# Patient Record
Sex: FEMALE | Race: WHITE | NOT HISPANIC OR LATINO | URBAN - METROPOLITAN AREA
[De-identification: names, ages, dates, MRNs, and addresses within clinical notes are randomized per-mention and may not be internally consistent; named-entity substitution may affect disease eponyms.]

---
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
## 2018-04-10 ENCOUNTER — OFFICE VISIT - HEALTHEAST (OUTPATIENT)
Dept: FAMILY MEDICINE | Facility: CLINIC | Age: 18
End: 2018-04-10

## 2018-04-10 ENCOUNTER — COMMUNICATION - HEALTHEAST (OUTPATIENT)
Dept: TELEHEALTH | Facility: CLINIC | Age: 18
End: 2018-04-10

## 2018-04-10 DIAGNOSIS — F90.9 ATTENTION DEFICIT HYPERACTIVITY DISORDER (ADHD): ICD-10-CM

## 2018-04-10 DIAGNOSIS — L98.9 SKIN LESION OF LEFT ARM: ICD-10-CM

## 2019-09-16 ENCOUNTER — OFFICE VISIT - HEALTHEAST (OUTPATIENT)
Dept: FAMILY MEDICINE | Facility: CLINIC | Age: 19
End: 2019-09-16

## 2019-09-16 DIAGNOSIS — T14.8XXA BRUISING: ICD-10-CM

## 2019-09-16 LAB
APTT PPP: 39 SECONDS (ref 24–37)
ERYTHROCYTE [DISTWIDTH] IN BLOOD BY AUTOMATED COUNT: 11.3 % (ref 11–14.5)
HCT VFR BLD AUTO: 45.9 % (ref 35–47)
HGB BLD-MCNC: 15.7 G/DL (ref 12–16)
INR PPP: 0.95 (ref 0.9–1.1)
MCH RBC QN AUTO: 31.5 PG (ref 27–34)
MCHC RBC AUTO-ENTMCNC: 34.3 G/DL (ref 32–36)
MCV RBC AUTO: 92 FL (ref 80–100)
PLATELET # BLD AUTO: 414 THOU/UL (ref 140–440)
PMV BLD AUTO: 6.9 FL (ref 7–10)
RBC # BLD AUTO: 4.99 MILL/UL (ref 3.8–5.4)
WBC: 9.4 THOU/UL (ref 4–11)

## 2019-09-19 ENCOUNTER — OFFICE VISIT - HEALTHEAST (OUTPATIENT)
Dept: FAMILY MEDICINE | Facility: CLINIC | Age: 19
End: 2019-09-19

## 2019-09-19 DIAGNOSIS — N94.10 DYSPAREUNIA IN FEMALE: ICD-10-CM

## 2019-09-19 DIAGNOSIS — T14.8XXA BRUISE: ICD-10-CM

## 2019-09-19 DIAGNOSIS — Z97.5 IUD CONTRACEPTION: ICD-10-CM

## 2019-09-19 LAB
ALBUMIN SERPL-MCNC: 4.5 G/DL (ref 3.5–5)
ALP SERPL-CCNC: 76 U/L (ref 45–120)
ALT SERPL W P-5'-P-CCNC: <9 U/L (ref 0–45)
ANION GAP SERPL CALCULATED.3IONS-SCNC: 10 MMOL/L (ref 5–18)
AST SERPL W P-5'-P-CCNC: 14 U/L (ref 0–40)
BASOPHILS # BLD AUTO: 0.1 THOU/UL (ref 0–0.2)
BASOPHILS NFR BLD AUTO: 1 % (ref 0–2)
BILIRUB SERPL-MCNC: 0.4 MG/DL (ref 0–1)
BUN SERPL-MCNC: 9 MG/DL (ref 8–22)
CALCIUM SERPL-MCNC: 10.1 MG/DL (ref 8.5–10.5)
CHLORIDE BLD-SCNC: 105 MMOL/L (ref 98–107)
CO2 SERPL-SCNC: 26 MMOL/L (ref 22–31)
CREAT SERPL-MCNC: 0.77 MG/DL (ref 0.6–1.1)
EOSINOPHIL # BLD AUTO: 0.6 THOU/UL (ref 0–0.4)
EOSINOPHIL NFR BLD AUTO: 5 % (ref 0–6)
ERYTHROCYTE [DISTWIDTH] IN BLOOD BY AUTOMATED COUNT: 11.4 % (ref 11–14.5)
GFR SERPL CREATININE-BSD FRML MDRD: >60 ML/MIN/1.73M2
GLUCOSE BLD-MCNC: 81 MG/DL (ref 70–125)
HCT VFR BLD AUTO: 42.2 % (ref 35–47)
HGB BLD-MCNC: 14.3 G/DL (ref 12–16)
LYMPHOCYTES # BLD AUTO: 3.3 THOU/UL (ref 0.8–4.4)
LYMPHOCYTES NFR BLD AUTO: 28 % (ref 20–40)
MCH RBC QN AUTO: 30.7 PG (ref 27–34)
MCHC RBC AUTO-ENTMCNC: 33.8 G/DL (ref 32–36)
MCV RBC AUTO: 91 FL (ref 80–100)
MONOCYTES # BLD AUTO: 0.8 THOU/UL (ref 0–0.9)
MONOCYTES NFR BLD AUTO: 7 % (ref 2–10)
NEUTROPHILS # BLD AUTO: 7.1 THOU/UL (ref 2–7.7)
NEUTROPHILS NFR BLD AUTO: 60 % (ref 50–70)
PLATELET # BLD AUTO: 433 THOU/UL (ref 140–440)
PMV BLD AUTO: 7.2 FL (ref 7–10)
POTASSIUM BLD-SCNC: 4.3 MMOL/L (ref 3.5–5)
PROT SERPL-MCNC: 7.2 G/DL (ref 6–8)
RBC # BLD AUTO: 4.64 MILL/UL (ref 3.8–5.4)
SODIUM SERPL-SCNC: 141 MMOL/L (ref 136–145)
TSH SERPL DL<=0.005 MIU/L-ACNC: 1.5 UIU/ML (ref 0.3–5)
WBC: 11.9 THOU/UL (ref 4–11)

## 2019-09-19 ASSESSMENT — MIFFLIN-ST. JEOR: SCORE: 1494.57

## 2019-10-02 ENCOUNTER — COMMUNICATION - HEALTHEAST (OUTPATIENT)
Dept: ADMINISTRATIVE | Facility: HOSPITAL | Age: 19
End: 2019-10-02

## 2019-10-02 ENCOUNTER — COMMUNICATION - HEALTHEAST (OUTPATIENT)
Dept: ONCOLOGY | Facility: CLINIC | Age: 19
End: 2019-10-02

## 2019-10-22 ENCOUNTER — OFFICE VISIT - HEALTHEAST (OUTPATIENT)
Dept: ONCOLOGY | Facility: CLINIC | Age: 19
End: 2019-10-22

## 2019-10-22 DIAGNOSIS — T14.8XXA BRUISE: ICD-10-CM

## 2019-10-22 LAB
APTT PPP: 42 SECONDS (ref 24–37)
BASOPHILS # BLD AUTO: 0.1 THOU/UL (ref 0–0.2)
BASOPHILS NFR BLD AUTO: 1 % (ref 0–2)
CLOSURE TME COLL+EPINEP BLD: 127 SEC (ref 1–180)
EOSINOPHIL # BLD AUTO: 0.3 THOU/UL (ref 0–0.4)
EOSINOPHIL NFR BLD AUTO: 4 % (ref 0–6)
ERYTHROCYTE [DISTWIDTH] IN BLOOD BY AUTOMATED COUNT: 12.3 % (ref 11–14.5)
FIBRINOGEN PPP-MCNC: 312 MG/DL (ref 170–410)
HCT VFR BLD AUTO: 40.1 % (ref 35–47)
HGB BLD-MCNC: 13.8 G/DL (ref 12–16)
INR PPP: 1 (ref 0.9–1.1)
LYMPHOCYTES # BLD AUTO: 2.2 THOU/UL (ref 0.8–4.4)
LYMPHOCYTES NFR BLD AUTO: 25 % (ref 20–40)
MCH RBC QN AUTO: 30.4 PG (ref 27–34)
MCHC RBC AUTO-ENTMCNC: 34.4 G/DL (ref 32–36)
MCV RBC AUTO: 88 FL (ref 80–100)
MONOCYTES # BLD AUTO: 0.7 THOU/UL (ref 0–0.9)
MONOCYTES NFR BLD AUTO: 8 % (ref 2–10)
NEUTROPHILS # BLD AUTO: 5.4 THOU/UL (ref 2–7.7)
NEUTROPHILS NFR BLD AUTO: 62 % (ref 50–70)
PATH REPORT.MICROSCOPIC SPEC OTHER STN: ABNORMAL
PLATELET # BLD AUTO: 397 THOU/UL (ref 140–440)
PMV BLD AUTO: 8.8 FL (ref 8.5–12.5)
RBC # BLD AUTO: 4.54 MILL/UL (ref 3.8–5.4)
WBC: 8.8 THOU/UL (ref 4–11)

## 2019-10-22 ASSESSMENT — MIFFLIN-ST. JEOR: SCORE: 1478.01

## 2019-10-23 ENCOUNTER — COMMUNICATION - HEALTHEAST (OUTPATIENT)
Dept: ADMINISTRATIVE | Facility: HOSPITAL | Age: 19
End: 2019-10-23

## 2019-10-24 LAB
LAB AP CHARGES (HE HISTORICAL CONVERSION): NORMAL
PATH REPORT.COMMENTS IMP SPEC: NORMAL
PATH REPORT.COMMENTS IMP SPEC: NORMAL
PATH REPORT.FINAL DX SPEC: NORMAL
PATH REPORT.MICROSCOPIC SPEC OTHER STN: NORMAL
PATH REPORT.RELEVANT HX SPEC: NORMAL

## 2019-10-25 LAB
FACT VIII ACT/NOR PPP: 70 % (ref 55–200)
VWF AG ACT/NOR PPP IA: 65 % (ref 50–200)
VWF:AC ACT/NOR PPP IA: 55 % (ref 50–180)

## 2019-10-30 LAB — VWF:RCO ACT/NOR PPP PL AGG: 62 %

## 2019-10-31 LAB — VON WILLEBRAND EVAL PPP-IMP: NORMAL

## 2019-11-06 ENCOUNTER — OFFICE VISIT - HEALTHEAST (OUTPATIENT)
Dept: ONCOLOGY | Facility: CLINIC | Age: 19
End: 2019-11-06

## 2019-11-06 DIAGNOSIS — R23.3 EASY BRUISABILITY: ICD-10-CM

## 2021-05-30 VITALS — BODY MASS INDEX: 28.42 KG/M2 | WEIGHT: 163 LBS

## 2021-05-31 VITALS — BODY MASS INDEX: 26.38 KG/M2 | WEIGHT: 151.31 LBS

## 2021-06-01 VITALS — WEIGHT: 145.31 LBS | BODY MASS INDEX: 25.34 KG/M2

## 2021-06-01 NOTE — PATIENT INSTRUCTIONS - HE
Lab work that is back so far is normal.  We will call you with remaining results.  Follow up with Dr. Flores for further workup.

## 2021-06-01 NOTE — TELEPHONE ENCOUNTER
Call placed to Dinora to attempt to schedule a consult with Hematology, referral received from Dr. Sandra Case.   Diagnosis for referral to hematology: Excessive bruising.   Left message with my contact information on it and reason for call. Asked that Dinora return my call so I may assist in scheduling an appointment with hematology.  Will attempt to call again to set up apt.     In Basket message received for Hematology Consult, referring physician is Dr.Thao Case. Call placed to Dinora to set up appointment. Same scheduled for Tuesday, October 22, 2019 at 2:00pm with . With a nurse apt at  1:45pm. E mail sent to [email or web address] with informational letter, MD card, [facility] intake form, medication and allergy list, and appointment letter. Work up for Excessive Bruising has been done at .   Medical records will collect any outside records.      I explained that Dinora would becoming to a cancer center and that the doctors are both cancer and blood doctors. Explained the appointment process of arriving early and bringing Health History and medication allergy list along to appointment.     Given [facility] to call if Dinora has further questions or concerns. [phone]

## 2021-06-01 NOTE — PROGRESS NOTES
ASSESSMENT/PLAN    Bruise  Traumatic and spontaneous  Normal lab results  No known etiology  Will refer to hematology for 2nd opinion  -     HM1(CBC and Differential)  -     Comprehensive Metabolic Panel  -     Thyroid Stimulating Hormone (TSH)  -     HM1 (CBC with Diff)    Dyspareunia in female  With associated heavy bleeding  Patient expressed interest in removal; she's come back for removal  May want to consider pelvic ultrasound   Denied STD concerns    IUD contraception  Patient expressed interest in removal; she's come back for removal      Orders Placed This Encounter   Procedures     Comprehensive Metabolic Panel     Thyroid Stimulating Hormone (TSH)     HM1 (CBC with Diff)           CHIEF COMPLAINT:  Chief Complaint   Patient presents with     bruising     bruise very easily     discuss removing IUD     bleeds after having intercourse     bad menstrual cramps     letter for stand up desk     for work       HISTORY OF PRESENT ILLNESS:  Dinora is a 19 y.o. female presenting to the clinic today for bruising and IUD removal. She is no longer taking adderall.     Bruising: She has always bruised easily but now they are taking longer to heal. Most of the bruises are spontaneous. One of them, she bumped her bed lightly on Saturday. She has bruises that are as old as 2 months. She went to a walk-in clinic on Monday because her mom wanted her to get checked out. She is not on any medication for depression or anxiety. She takes ibuprofen and aleve when she is on her period but she has not taken it within the last 2 weeks. She was brushing her teeth and nicked her gums which caused her mouth to fill with blood. She still has heavy periods even with the Mirena, which was placed in 2017. She changes her tampon every 2-3 hours. She denies any blood in the stools or urine. She has never had a nose bleed.     IUD Removal: She would like to discuss removing her IUD. She will be sitting and feel a sharp pain in her uterus.  "She also gets pain whenever she has sex with her boyfriend. She will then get a full cycle afterward. She would probably go back on the hormone pill if the IUD was removed.     Standing Desk Note: At work, she has a sitting desk. All of the new offices have a standing desk. The company's home office also got all new standing desks. They are unwilling to re-do everyone else's desk. She will occasionally get back pain with her periods. She knows standing desks are better for preventing back problems as well. She inquires about getting a note from work to get a standing desk.     REVIEW OF SYSTEMS:   Constitutional: Negative.   HENT: Negative.   Eyes: Negative.   Respiratory: Negative.   Cardiovascular: Negative.   Gastrointestinal: Negative.   Endocrine: Negative.   Genitourinary: Negative.   Musculoskeletal: Negative.   Skin: Negative.   Allergic/Immunologic: Negative.   Neurological: Negative.   Hematological: Negative.   Psychiatric/Behavioral: Negative.   All other systems are negative.    PFSH:  Dad  when she was 10 years old. He had a clotting disorder but they are unsure of what he had.     TOBACCO USE:  Social History     Tobacco Use   Smoking Status Never Smoker   Smokeless Tobacco Never Used       VITALS:  Vitals:    19 1534   BP: 112/84   Patient Site: Left Arm   Patient Position: Sitting   Cuff Size: Adult Regular   Pulse: 92   Weight: 167 lb (75.8 kg)   Height: 5' 3.5\" (1.613 m)     Wt Readings from Last 3 Encounters:   19 167 lb (75.8 kg)   19 167 lb (75.8 kg)   04/10/18 145 lb 5 oz (65.9 kg) (75 %, Z= 0.69)*     * Growth percentiles are based on CDC (Girls, 2-20 Years) data.       PHYSICAL EXAM:  Constitutional: Patient is oriented to person, place, and time. Patient appears well-developed and well-nourished. No distress.   Head: Normocephalic and atraumatic.   Right Ear: External ear normal.   Left Ear: External ear normal.   Neurological: Patient is alert and oriented to person, " place, and time. Patient has normal reflexes. No cranial nerve deficit. Coordination normal.   Skin: Skin is warm and dry. No rash noted. Patient is not diaphoretic. No erythema. No pallor. Multiple bruises at different stages on arms and legs bilaterally    ADDITIONAL HISTORY SUMMARIZED (2): Reviewed walk-in clinic note from 9/16/19 for bruising.  DECISION TO OBTAIN EXTRA INFORMATION (1): None.   RADIOLOGY TESTS (1): None.  LABS (1): Reviewed labs from 9/16/19 and ordered labs today.   MEDICINE TESTS (1): None.  INDEPENDENT REVIEW (2 each): None.     The visit lasted a total of 10 minutes face to face with the patient. Over 50% of the time was spent counseling and educating the patient about bruising and IUD removal.    ICatia,  am scribing for and in the presence of, Dr. Flores.    I, Dr. Flores, personally performed the services described in this documentation, as scribed by Catia Romero in my presence, and it is both accurate and complete.    MEDICATIONS:  Current Outpatient Medications   Medication Sig Dispense Refill     levonorgestrel (MIRENA) 20 mcg/24 hr (5 years) IUD 1 each by Intrauterine route once.       triamcinolone (KENALOG) 0.1 % ointment Apply topically 2 (two) times a day. 30 g 0     No current facility-administered medications for this visit.        Total data points: 3

## 2021-06-01 NOTE — PROGRESS NOTES
"ASSESSMENT:   1. Bruising  HM2(CBC w/o Differential)    APTT(PTT)    INR    [facility] RED       PLAN:  Labs were obtained at patient request, CBC is without abnormality and normal platelet count.  INR, PTT pending.  Discussed with patient that further work-up would have to be pursued after discussion with her primary care provider as I am unable to obtain further testing from a walk-in care.  She is reassured at this point, and intends to follow-up with primary care for further discussion.    I discussed red flag symptoms, return precautions to clinic/ER and follow up care with patient/parent.  Expected clinical course, symptomatic cares advised. Questions answered. Patient/parent amenable with plan.    Patient Instructions:  Patient Instructions   Lab work that is back so far is normal.  We will call you with remaining results.  Follow up with Dr. Flores for further workup.      SUBJECTIVE:   Dinora Colunga is a 19 y.o. female who presents today with concerns regarding a \"clotting disorder\".  Patient notes that she bruises very easily, typically to her legs, but occasionally to her arms.  She notes that there is always a cause of the bruise such as bumping into something or injury, but feels that her bruises have worsened over the past several months.  She also notes that this morning she was brushing her teeth and had bleeding from her gums.  She has not seen a dentist in quite some time, cannot remember when she saw one last.  She notes that her dad had a \"blood clotting problem\", but is unsure of what this was and he passed away when she was 10.  States that her mother insisted she be seen today to be worked up for a clotting disorder.  Notes that she has also had very heavy periods since having an IUD put in in 2017, however the IUD was put in due to heavy periods and cramping.  Denies dizziness, shortness of breath, chest pain, extremity pain, abdominal pain, any other concerns.  Notes that she discontinued taking " "ibuprofen because her mother told her that it was bad for her \"blood problems\", asked her to begin taking naproxen instead which she takes 1-2 times a month for menstrual cramps.      ROS:  Comprehensive 12 pt ROS completed, positives noted in HPI, otherwise negative.      Past Medical History:  Patient Active Problem List   Diagnosis     Attention deficit hyperactivity disorder (ADHD)     Controlled substance agreement signed, 6/29/16, adderall 20mg BID     IUD insertion 1/3/17       Surgical History:  Past Surgical History:   Procedure Laterality Date     TONSILLECTOMY             Family History:  Family History   Problem Relation Age of Onset     Arthritis Mother      Diabetes Father      Hypertension Father      Heart disease Father      ADD / ADHD Brother      ADD / ADHD Brother      ADD / ADHD Brother        Reviewed; Non-contributory    Social History     Tobacco Use   Smoking Status Never Smoker   Smokeless Tobacco Never Used       Current Medications:  Current Outpatient Medications on File Prior to Visit   Medication Sig Dispense Refill     levonorgestrel (MIRENA) 20 mcg/24 hr (5 years) IUD 1 each by Intrauterine route once.       triamcinolone (KENALOG) 0.1 % ointment Apply topically 2 (two) times a day. 30 g 0     [DISCONTINUED] dextroamphetamine-amphetamine (ADDERALL) 20 mg Tab Take 20 mg by mouth 2 (two) times a day. One of three 60 tablet 0     [DISCONTINUED] dextroamphetamine-amphetamine (ADDERALL) 20 mg Tab Take 20 mg by mouth 2 (two) times a day. Two of three 60 tablet 0     [DISCONTINUED] dextroamphetamine-amphetamine (ADDERALL) 20 mg Tab Take 20 mg by mouth 2 (two) times a day. Three of three 60 tablet 0     No current facility-administered medications on file prior to visit.        Allergies:   No Known Allergies    OBJECTIVE:   Vitals:    09/16/19 1555 09/16/19 1558   BP: 140/88 140/82   Patient Site: Right Arm Right Arm   Patient Position: Sitting Sitting   Cuff Size: Adult Large Adult Large "   Pulse: (!) 103    Resp: 16    Temp: 98.7  F (37.1  C)    TempSrc: Oral    SpO2: 98%    Weight: 167 lb (75.8 kg)      Physical exam reveals a pleasant 19 y.o. female.   General: Appears healthy, alert and cooperative. Non-toxic appearance.  Eyes: Nonicteric  Mouth:  Mucosa pink and moist.  Normal dentition, no bleeding, no lacerations or wounds  Neck: supple  Pulmonary/Chest: Chest is clear, no wheezing, rhonchi or rales. Symmetric air entry throughout both lung fields. Symmetrical chest wall movement.  Heart: regular rate and rhythm, no murmur, rub or gallop  Abdomen: soft, nontender. No masses or organomegaly  Neuro: Alert, oriented. Non-focal.  Skin: pink, warm, dry.  Multiple bruises to the lower extremities in various stages of healing.  Psychiatric: Normal mood and affect.  Normal judgement and thought content. Normal behavior.       RADIOLOGY    none  LABORATORY STUDIES    Recent Results (from the past 24 hour(s))   HM2(CBC w/o Differential)   Result Value Ref Range    WBC 9.4 4.0 - 11.0 thou/uL    RBC 4.99 3.80 - 5.40 mill/uL    Hemoglobin 15.7 12.0 - 16.0 g/dL    Hematocrit 45.9 35.0 - 47.0 %    MCV 92 80 - 100 fL    MCH 31.5 27.0 - 34.0 pg    MCHC 34.3 32.0 - 36.0 g/dL    RDW 11.3 11.0 - 14.5 %    Platelets 414 140 - 440 thou/uL    MPV 6.9 (L) 7.0 - 10.0 fL           Jessica Vernon, PRATIMA

## 2021-06-02 NOTE — PROGRESS NOTES
[facility] Hematology and Oncology Consult Note    Patient: Dinora Colunga  MRN: [id number]  Date of Service: 10/22/2019      Reason for Visit    Chief Complaint   Patient presents with     Benign Hematology     referred by Dr. Flores regarding easy bruising    Assessment/Plan    ECOG Performance   ECOG Performance Status: 0  Distress Assessment  Distress Assessment Score: 3(visit today)    #.  Easy bruising  #.  Minor mucosal bleeding.  Has not required any intervention.  #.  Heavy menstrual bleeding     She is history of mucosa bleeding and bruising but no purpura.  No history to suggest malnutrition and, vitamin deficiency, autoimmune disease, liver disease or myeloproliferative diseases.  Never received pharmacological intervention for bleeding or required blood transfusion.  No real family history of bleeding disorder.  Her platelet count was normal.  I discussed about possible etiologies which is numerous.  I discussed about initial screening tests as below.  I also explained to her that sometimes it could be idiopathic that we may not identify the etiology which is fairly common in a woman with bruising on the extremities.   Follow-up with me in about 2 weeks to review the results.     I discussed about potential intervention with fibrinolytic agent for heavy menstrual bleeding if needed.  Clinically though, she does not have anemia and it is encouraging.      Problem List    1. Bruise  APTT(PTT)    INR    Fibrinogen    Platelet Function Test    Von Willebrand Factor Activity (VWF:Act)    Von Willebrand Antigen    Factor 8 Assay    Morphology,Smear Review (MORP)    Peripheral Blood Smear, Path Review     ______________________________________________________________________________    History    Ms. Dinora Colunga is a very pleasant 19-year-old female who is here for evaluation of frequent/severe bruising.  She remembered her whole life that she is very easy to bruise.  Her skin bruising is only in arm or  lower extremities.  She does not recall any spontaneous bruising in her body.  No history of frequent epistaxis.  Intermittent gum bleeding with brushing.  Denies blood in urine or bowel.  She noted vaginal bleeding after sexual intercourse very easy.  She has heavy menstrual cycle with approximately 5-7 days in each cycle.  She has history of anemia but never been on iron supplements or  never received blood transfusion.  She was on birth control pil, but she is currently have Mirena since 2017 because she forgot to take birth control pill regularly.  Her current menstrual cycle usually lasted about 2 weeks out of every 28-30-day cycle.  She never needs to take any medication to control her menstrual bleeding.    History of left breast cyst removal and tonsillectomy.  No recall of excessive bleeding postoperatively.    She does not know her father's side of the family.  Her mother does not have bleeding disorder or easy bruising.  She has 1 brother and 1 brother.  They do not have any bleeding tendency.    She does not take any medication on regular basis.  She denies use of over-the-counter supplements.  Never smoker.  She drinks alcohol very occasionally.    No history of connective tissue disease, chronic inflammatory disease, or chronic liver disease or kidney disease.    Past History    Past Medical History:   Diagnosis Date     ADHD (attention deficit hyperactivity disorder)     Family History   Problem Relation Age of Onset     Arthritis Mother      Diabetes Father      Hypertension Father      Heart disease Father      ADD / ADHD Brother      ADD / ADHD Brother      ADD / ADHD Brother       Past Surgical History:   Procedure Laterality Date     BREAST LUMPECTOMY       TONSILLECTOMY      Social History     Socioeconomic History     Marital status: Single     Spouse name: Not on file     Number of children: Not on file     Years of education: Not on file     Highest education level: Not on file   Occupational  History     Not on file   Social Needs     Financial resource strain: Not on file     Food insecurity:     Worry: Not on file     Inability: Not on file     Transportation needs:     Medical: Not on file     Non-medical: Not on file   Tobacco Use     Smoking status: Never Smoker     Smokeless tobacco: Never Used   Substance and Sexual Activity     Alcohol use: No     Comment: 1, if any     Drug use: No     Sexual activity: Not Currently   Lifestyle     Physical activity:     Days per week: Not on file     Minutes per session: Not on file     Stress: Not on file   Relationships     Social connections:     Talks on phone: Not on file     Gets together: Not on file     Attends [employer] service: Not on file     Active member of club or organization: Not on file     Attends meetings of clubs or organizations: Not on file     Relationship status: Not on file     Intimate partner violence:     Fear of current or ex partner: Not on file     Emotionally abused: Not on file     Physically abused: Not on file     Forced sexual activity: Not on file   Other Topics Concern     Not on file   Social History Narrative     Not on file        Allergies    No Known Allergies    Review of Systems    General  General (WDL): Exceptions to WDL  Fatigue: None  Fever: None  Generalized Muscle Weakness: None  Weight Loss: Yes - Recent (Greater than 3 months)(6# 9/19/1940-[id number]-[id number] up early to exercise)  ENT  ENT (WDL): Exceptions to WDL  Changes in vision: None  Glasses or Contacts: Yes - Chronic (Greater than 3 months)  Hearing loss: None  Hearing Aids: None  Tinnitus: None  Pain/Pressure in ears: None  Sinus Congestion/Drainage: None  Hoarseness: None  Sore Throat: None  Dental Problems: None  Dentures: None  Respiratory  Respiratory (WDL): All respiratory elements are within defined limits  Cardiovascular  Cardiovascular (WDL): Exceptions to WDL  Palpitations: None  Edema Limbs: None  Irregular Heart Beat: None  Chest Pain:  "None  Lightheadedness: Yes - Recent (Less than 3 months)  Endocrine  Endocrine (WDL): All endocrine elements are within defined limits  Gastrointestinal  Gastrointestinal (WDL): Exceptions to WDL  Difficulty Swallowing: None  Heartburn: None  Constipation: None  Yellowish skin and/or eyes: None  Blood from rectum: None  Nausea and Vomiting: None  Abdominal Pain: Yes - Recent (Less than 3 months)  Diarrhea: None  Have had black or tan stools?: None  Hemorrhoids: None  Poor Appetite: None  Musculoskeletal  Musculoskeletal (WDL): All musculoskeletal elements are within defined limits  Neurological  Neurological (WDL): All neurological elements are within defined limits  Dominant Hand: Right  Psychological/Emotional  Psychological/Emotional (WDL): Exceptions to WDL  Depression: None  Insomnia: None  Panic attacks: None  Anxiety: Yes - Recent (Less than 3 months)  Daytime sleepiness: None  Hallucinations: None  Psychosocial needs or not coping well: None  Hematological/Lymphatic  Hematological/Lymphatic (WDL): Exceptions to WDL  Bleeding gums: None  Bruising: Yes - Chronic (Greater than 3 months)  Epistaxis: None  Swollen glands: None  Dermatological  Dermatologic (WDL): Exceptions to WDL(last week: M-T\"stress hives\" -hot, itchy, painful)  Open wounds: None  Rash : None  Hair Loss: None  Healing Incision: None  Changes in moles: None  Significant sunburn: None  Genitourinary/Reproductive  Genitourinary/Reproductive (WDL): All genitourinary/reproductive elements are within defined limits  Reproductive (Females only)  Menstrual irritation or increase in discharge: No  Age at start of periods: 11  Last menstural cycle: 2 weeks ago  Number of pregnancies: 0  Age at first pregnancy: n/a  Patient Pregnant?: No  Is the patient trying to get pregnant?: No  Is the patient on birth control: Yes(IUD-Mirena-December, 2017?)  Pain  Currently in Pain: Yes  Pain Score (Initial OR Reassessment): 2(increases at times)  Pain Frequency: " "Constant/continuous  Location: \"uterus\"  Pain Characteristics : Cramping(\"feels like menstrual cramps but frequent)  Pain Intervention(s): Home medication(Advil, Tylenol when needed)  Response to Interventions: improved    Physical Exam    Recent Vitals 10/22/2019   Height 5' 4\"   Weight 161 lbs 10 oz   BSA (m2) 1.82 m2   /88   Pulse 85   Temp 98.1   Temp src 1   SpO2 98   Some recent data might be hidden     General: alert, awake, not in acute distress  HEENT: Head: Normal, normocephalic, atraumatic.  Eye: Normal external eye, conjunctiva, lids cornea, SISSY.  Ears:  Non-tender.  Nose: Normal external nose, mucus membranes and septum.  Pharynx: Dental Hygiene adequate. Normal buccal mucosa. Normal pharynx.  Neck / Thyroid: Supple, no masses, nodes, nodules or enlargement.  Lymphatics: No abnormally enlarged lymph nodes.  Chest: Normal chest wall and respirations. Clear to auscultation.  Heart: S1 S2 RRR, no murmur.   Abdomen: abdomen is soft without significant tenderness, masses, organomegaly or guarding  Extremities: normal strength, tone, and muscle mass  Skin: Different ages of ecchymosis in her bilateral lower extremity more dominantly than in bilateral upper extremity.  Nose petechiae or ecchymosis in the torso.    CNS: non focal.    Lab Results    Recent Results (from the past 168 hour(s))   APTT(PTT)   Result Value Ref Range    PTT 42 (H) 24 - 37 seconds   INR   Result Value Ref Range    INR 1.00 0.90 - 1.10   Fibrinogen   Result Value Ref Range    Fibrinogen 312 170 - 410 mg/dL   Platelet Function Test   Result Value Ref Range    PFA-COL/ 1 - 180 sec   Morphology,Smear Review (MORP)   Result Value Ref Range    Pathology, Smear Review See Separate Pathology Report (!) (none)    WBC 8.8 4.0 - 11.0 thou/uL    RBC 4.54 3.80 - 5.40 mill/uL    Hemoglobin 13.8 12.0 - 16.0 g/dL    Hematocrit 40.1 35.0 - 47.0 %    MCV 88 80 - 100 fL    MCH 30.4 27.0 - 34.0 pg    MCHC 34.4 32.0 - 36.0 g/dL    RDW 12.3 " 11.0 - 14.5 %    Platelets 397 140 - 440 thou/uL    MPV 8.8 8.5 - 12.5 fL    Neutrophils % 62 50 - 70 %    Lymphocytes % 25 20 - 40 %    Monocytes % 8 2 - 10 %    Eosinophils % 4 0 - 6 %    Basophils % 1 0 - 2 %    Neutrophils Absolute 5.4 2.0 - 7.7 thou/uL    Lymphocytes Absolute 2.2 0.8 - 4.4 thou/uL    Monocytes Absolute 0.7 0.0 - 0.9 thou/uL    Eosinophils Absolute 0.3 0.0 - 0.4 thou/uL    Basophils Absolute 0.1 0.0 - 0.2 thou/uL       Imaging Results    No results found.      Signed by: Keila Holder MD

## 2021-06-03 VITALS
OXYGEN SATURATION: 98 % | HEIGHT: 64 IN | WEIGHT: 161.6 LBS | HEART RATE: 85 BPM | SYSTOLIC BLOOD PRESSURE: 158 MMHG | BODY MASS INDEX: 27.59 KG/M2 | DIASTOLIC BLOOD PRESSURE: 88 MMHG | TEMPERATURE: 98.1 F

## 2021-06-03 VITALS
RESPIRATION RATE: 16 BRPM | SYSTOLIC BLOOD PRESSURE: 140 MMHG | HEART RATE: 103 BPM | OXYGEN SATURATION: 98 % | TEMPERATURE: 98.7 F | BODY MASS INDEX: 29.12 KG/M2 | DIASTOLIC BLOOD PRESSURE: 82 MMHG | WEIGHT: 167 LBS

## 2021-06-03 VITALS
BODY MASS INDEX: 29.09 KG/M2 | WEIGHT: 169.5 LBS | DIASTOLIC BLOOD PRESSURE: 72 MMHG | TEMPERATURE: 98.5 F | HEART RATE: 74 BPM | SYSTOLIC BLOOD PRESSURE: 126 MMHG | OXYGEN SATURATION: 98 %

## 2021-06-03 VITALS
SYSTOLIC BLOOD PRESSURE: 112 MMHG | WEIGHT: 167 LBS | HEART RATE: 92 BPM | DIASTOLIC BLOOD PRESSURE: 84 MMHG | BODY MASS INDEX: 28.51 KG/M2 | HEIGHT: 64 IN

## 2021-06-03 NOTE — PROGRESS NOTES
[facility] Hematology and Oncology Progress Note    Patient: Dinora Colunga  MRN: [id number]  Date of Service: 11/06/2019        Reason for Visit    Chief Complaint   Patient presents with     Benign Hematology       Assessment and Plan  Cancer Staging  No matching staging information was found for the patient.    ECOG Performance   ECOG Performance Status: 0     Distress Assessment  Distress Assessment Score: No distress    Pain  Currently in Pain: Yes  Pain Score (Initial OR Reassessment): 5  Location: uterus    #.  Easy bruisability   I reviewed the initial coagulation studies and von Willebrand panel.  All came back unremarkable except mildly elevated PTT.  I discussed about potentially proceeding with additional study for inhibitor screening or factor deficiency.  Factor VIII has been checked for von Willebrand panel and it was normal therefore unlikely to be a factor deficiency although there few other factors that we can expand the work-up.  I explained to her that I have low suspicion for bleeding disorder at this point.  We could hold off for additional work-up unless she has more clinically relevant bleeding.  She agreed with the plan.  I explained to her as well that it could be some collagen disorder or idiopathic that are not a bleeding disorder.   Follow-up with me as needed.   She is advised to call me with any concerns or questions.    Problem List    1. Easy bruisability          ______________________________________________________________________________    History of Present Illness    Dinora is here to review the blood work completed up a couple weeks ago.  She reported that she is overall doing the same.  No new bruising.  No menstrual cycle since 9/13/2019.  Noted no bleeding after intercourse.    Pain Status  Currently in Pain: Yes    Review of Systems    Oncology Nurse Assessment/CMA Intake: Constitutional  Constitutional (WDL): All constitutional elements are within defined  limits  Neurosensory  Neurosensory (WDL): All neurosensory elements are within defined limits  Eye   Eye Disorder (WDL): Assessment not pertinent to visit  Ear  Ear Disorder (WDL): Assessment not pertinent to visit  Cardiovascular  Cardiovascular (WDL): All cardiovascular elements are within defined limits  Pulmonary  Respiratory (WDL): Within Defined Limits  Gastrointestinal  Gastrointestinal (WDL): All gastrointestinal elements are within defined limits  Genitourinary  Genitourinary (WDL): All genitourinary elements are within defined limits  Lymphatic  Lymph (WDL): Assessment not pertinent to visit  Musculoskeletal and Connective Tissue  Musculoskeletal and Connetive Tissue Disorders (WDL): All Musculoskeletal and Connetive Tissue Disorder elements are within defined limits  Integumentary  Integumentary (WDL): All integumentary elements are within defined limits  Patient Coping  Patient Coping: Open/discussion  Distress Assessment  Distress Assessment Score: No distress  Accompanied by  Accompanied by: Alone  Oral Chemo Adherence         Past History  Past Medical History:   Diagnosis Date     ADHD (attention deficit hyperactivity disorder)        Physical Exam    Recent Vitals 11/6/2019   Height -   Weight 169 lbs 8 oz   BSA (m2) 1.86 m2   /98   Pulse 97   Temp 98.5   Temp src 1   SpO2 98   Some recent data might be hidden     General: alert, awake, not in acute distress    Lab Results    No results found for this or any previous visit (from the past 168 hour(s)).    Imaging    No results found.      Signed by: Keila Holder MD

## 2021-06-08 NOTE — PROGRESS NOTES
IUD Insertion Procedure Note    Pre-operative Diagnosis: Contraception, mirena IUD Insertion    Post-operative Diagnosis: same    Indications: contraception    Procedure Details   Urine pregnancy test was done and result was negative.  The risks (including infection, bleeding, pain, and uterine perforation) and benefits of the procedure were explained to the patient and Written informed consent was obtained.      Cervix cleansed with Betadine. We paused multiple times because of pain and patient's requests. Uterus was finally sounded to 7 cm. IUD inserted without difficulty. String visible and trimmed.     Condition:  Stable    Complications:  None    Plan:  The patient was advised to call for any fever or for prolonged or severe pain or bleeding. She was advised to use OTC analgesics as needed for mild to moderate pain.  She was advised to feel for the strings in 2 weeks or come in if she is unable to feel the strings.

## 2021-06-12 NOTE — PROGRESS NOTES
ASSESSMENT/PLAN:  1. Controlled substance agreement signed, 6/29/16, adderall 20mg BID  2. Attention deficit hyperactivity disorder (ADHD)  Refilled.  F/u 6 months  Reviewed side effects  She takes med holiday over the summer  - dextroamphetamine-amphetamine (ADDERALL) 20 mg Tab; Take 20 mg by mouth 2 (two) times a day. One of three  Dispense: 60 tablet; Refill: 0  - dextroamphetamine-amphetamine (ADDERALL) 20 mg Tab; Take 20 mg by mouth 2 (two) times a day. Two of three  Dispense: 60 tablet; Refill: 0  - dextroamphetamine-amphetamine (ADDERALL) 20 mg Tab; Take 20 mg by mouth 2 (two) times a day. Three of three  Dispense: 60 tablet; Refill: 0    CHIEF COMPLAINT:  Chief Complaint   Patient presents with     med check     Medication Refill       HISTORY OF PRESENT ILLNESS:  Dinora is a 17 y.o. female presenting to the clinic today for a follow up for ADHD. She takes Adderall 20mg twice daily. She is on summer break, and has not taken the Adderall. She goes back to school in a couple of weeks. She will start the Adderall when her school year starts. This dose works well for her.     Contraception: Since she has come home from school in May 2017, she has had on and off bleeding. During May and June, she had bleeding the month. She had some associated cramping. She does have a Mirena IUD. She did take OCP's in February for a ruptured ovarian cyst. She did not have bleeding with the OCP's. No concerns for STD's.     REVIEW OF SYSTEMS:   Constitutional: Negative.   HENT: Negative.   Eyes: Negative.   Respiratory: Negative.   Cardiovascular: Negative.   Gastrointestinal: Negative.   Endocrine: Negative.   Genitourinary: Negative.   Musculoskeletal: Negative.   Skin: Negative.   Allergic/Immunologic: Negative.   Neurological: Negative.   Hematological: Negative.   Psychiatric/Behavioral: Negative.   All other systems are negative.    PFSH:  No new history.     TOBACCO USE:  History   Smoking Status     Never Smoker    Smokeless Tobacco     Never Used       VITALS:  Vitals:    08/07/17 1328   BP: 96/60   Patient Site: Left Arm   Patient Position: Sitting   Cuff Size: Adult Regular   Pulse: 80   Weight: 151 lb 5 oz (68.6 kg)     Wt Readings from Last 3 Encounters:   08/07/17 151 lb 5 oz (68.6 kg) (83 %, Z= 0.94)*   01/03/17 163 lb (73.9 kg) (90 %, Z= 1.30)*   12/27/16 163 lb 7 oz (74.1 kg) (91 %, Z= 1.31)*     * Growth percentiles are based on [facility] 2-20 Years data.       PHYSICAL EXAM:  Constitutional: Patient is oriented to person, place, and time. Patient appears well-developed and well-nourished. No distress.   Cardiovascular: Normal rate.  Pulmonary/Chest: Effort normal. No respiratory distress.   Neurological: Patient is alert and oriented to person, place, and time.  The patient has good eye contact.  No psychomotor retardation or stereotypical behaviors.  Speech is regular rate, regular rhythm, adequate responses.  Mood is stable and affect is congruent mood.  No suicidal or homicidal intent.  No hallucination.      Results for orders placed or performed in visit on 01/03/17   Pregnancy, Urine   Result Value Ref Range    Pregnancy Test, Urine Negative Negative    Specific Gravity, UA 1.025 1.001 - 1.030           ADDITIONAL HISTORY SUMMARIZED (2): None.  DECISION TO OBTAIN EXTRA INFORMATION (1): [facility].   RADIOLOGY TESTS (1): None.  LABS (1): None.  MEDICINE TESTS (1): None.  INDEPENDENT REVIEW (2 each): None.     INguyen, am scribing for and in the presence of, Dr. Flores.    Melina SORTO MD , personally performed the services described in this documentation, as scribed by Nguyen Doll in my presence, and it is both accurate and complete.    MEDICATIONS:  Current Outpatient Prescriptions   Medication Sig Dispense Refill     dextroamphetamine-amphetamine (ADDERALL) 20 mg Tab Take 20 mg by mouth 2 (two) times a day. One of three 60 tablet 0      dextroamphetamine-amphetamine (ADDERALL) 20 mg Tab Take 20 mg by mouth 2 (two) times a day. Two of three 60 tablet 0     dextroamphetamine-amphetamine (ADDERALL) 20 mg Tab Take 20 mg by mouth 2 (two) times a day. Three of three 60 tablet 0     levonorgestrel (MIRENA) 20 mcg/24 hr (5 years) IUD 1 each by Intrauterine route once.       No current facility-administered medications for this visit.        Total data points: 1

## 2021-06-17 NOTE — PROGRESS NOTES
ASSESSMENT/PLAN:  Skin lesion of left arm  Skin lesions are suspicious for eczema versus psoriasis.  Will try her on triamcinolone twice a day for 14 days.  Continue to monitor symptoms and follow-up in 3 months if she has further questions or concerns.  I advised taking vitamin D as well.  The patient verbalized understanding and agree with the plan  -     triamcinolone (KENALOG) 0.1 % ointment; Apply topically 2 (two) times a day.  Dispense: 30 g; Refill: 0    Attention deficit hyperactivity disorder (ADHD)  Refilled  -     dextroamphetamine-amphetamine (ADDERALL) 20 mg Tab; Take 20 mg by mouth 2 (two) times a day. One of three  Dispense: 60 tablet; Refill: 0  -     dextroamphetamine-amphetamine (ADDERALL) 20 mg Tab; Take 20 mg by mouth 2 (two) times a day. Two of three  Dispense: 60 tablet; Refill: 0  -     dextroamphetamine-amphetamine (ADDERALL) 20 mg Tab; Take 20 mg by mouth 2 (two) times a day. Three of three  Dispense: 60 tablet; Refill: 0    URI  Supportive cares advised.  She will contact me if she has further questions or concerns.      CHIEF COMPLAINT:  Chief Complaint   Patient presents with     dry red patch on arm     left arm x 6 week, does not itch     Medication Refill       HISTORY OF PRESENT ILLNESS:  Dinora is a 17 y.o. female presenting to the clinic today for a rash. She has dry patches on her left arm. They are red for her. They are not itchy. She has 3 patches on her left upper arm; it started with one larger patch, and has had occurrence of 2 smaller ones. She has had these for about the past 6 weeks. There is a family history of psoriasis.       REVIEW OF SYSTEMS:   Constitutional: Negative.   HENT: Negative.   Eyes: Negative.   Respiratory: Negative.   Cardiovascular: Negative.   Gastrointestinal: Negative.   Endocrine: Negative.   Genitourinary: Negative.   Musculoskeletal: Negative.   Allergic/Immunologic: Negative.   Neurological: Negative.   Hematological: Negative.    Psychiatric/Behavioral: Negative.   All other systems are negative.    PFSH:  No new history.     TOBACCO USE:  History   Smoking Status     Never Smoker   Smokeless Tobacco     Never Used       VITALS:  Vitals:    04/10/18 1605   BP: 122/72   Pulse: 68   Weight: 145 lb 5 oz (65.9 kg)     Wt Readings from Last 3 Encounters:   04/10/18 145 lb 5 oz (65.9 kg) (75 %, Z= 0.69)*   08/07/17 151 lb 5 oz (68.6 kg) (83 %, Z= 0.94)*   01/03/17 163 lb (73.9 kg) (90 %, Z= 1.30)*     * Growth percentiles are based on CDC 2-20 Years data.       PHYSICAL EXAM:  Constitutional: Patient is oriented to person, place, and time. Patient appears well-developed and well-nourished. No distress.   Cardiovascular: Normal rate.  Pulmonary/Chest: Effort normal. No respiratory distress.   Neurological: Patient is alert and oriented to person, place, and time.  Skin: Skin is warm and dry. 3 erythematous patches with some skin flakes on left upper arm measuring 2mm, 3mm and larger patch about 6-7mm in size.     Results for orders placed or performed in visit on 01/03/17   Pregnancy, Urine   Result Value Ref Range    Pregnancy Test, Urine Negative Negative    Specific Gravity, UA 1.025 1.001 - 1.030       ADDITIONAL HISTORY SUMMARIZED (2): None.  DECISION TO OBTAIN EXTRA INFORMATION (1): None.   RADIOLOGY TESTS (1): None.  LABS (1): None.  MEDICINE TESTS (1): None.  INDEPENDENT REVIEW (2 each): None.     I, Nguyen Doll, am scribing for and in the presence of, Dr. Flores.    Melina SORTO MD , personally performed the services described in this documentation, as scribed by Nguyen Doll in my presence, and it is both accurate and complete.    MEDICATIONS:  Current Outpatient Prescriptions   Medication Sig Dispense Refill     dextroamphetamine-amphetamine (ADDERALL) 20 mg Tab Take 20 mg by mouth 2 (two) times a day. One of three 60 tablet 0     dextroamphetamine-amphetamine (ADDERALL) 20 mg Tab Take 20 mg by  mouth 2 (two) times a day. Two of three 60 tablet 0     dextroamphetamine-amphetamine (ADDERALL) 20 mg Tab Take 20 mg by mouth 2 (two) times a day. Three of three 60 tablet 0     levonorgestrel (MIRENA) 20 mcg/24 hr (5 years) IUD 1 each by Intrauterine route once.       triamcinolone (KENALOG) 0.1 % ointment Apply topically 2 (two) times a day. 30 g 0     No current facility-administered medications for this visit.        Total data points: 0

## 2021-06-19 NOTE — LETTER
Letter by Keila Holder MD at      Author: Keila Holder MD Service: -- Author Type: --    Filed:  Encounter Date: 10/2/2019 Status: Signed       Dear Dinora Colunga    Thank you for choosing [facility] for your care.  We are committed to providing you with the highest quality and compassionate healthcare services.  The following information pertains to your first appointment with our clinic.    Date/Time of appointment:  Tuesday, October 22, 2019 at 1:45 pm      Note: Please arrive 15 minutes prior to your appointment time.  This allows time to complete forms, possible labs and nursing assessment.    Name of your Physician:  Keila Holder MD    What to bring to your appointment:    Completed Patient History/Initial Nursing Assessment and Medication/Allergy List (these forms were sent to you).    Any paperwork or films from your physician that we have asked you to bring.    Your current insurance card(s).    Parking:    Please refer to the map included to direct you to [facility].    You can park in any visitor/patient parking area you choose. There is no charge for parking at [facility].     Enter the hospital at the front/main entrance.      Please check in with our  representatives who will escort you to the clinic located in [address] of the [facility].    We hope these instructions are helpful to you.  If you have any questions or concerns, please call us at [phone].  It is our pleasure to assist you.    Warm Regards,  Elle Anton    [phone]

## 2021-06-19 NOTE — LETTER
Letter by Melina Rogers MD at      Author: Melina Rogers MD Service: -- Author Type: --    Filed:  Encounter Date: 9/19/2019 Status: (Other)         September 19, 2019     Patient: Dniora Colunga   YOB: 2000   Date of Visit: 9/19/2019       To Whom it May Concern:    Dinora Colunga was seen in my clinic on 9/19/2019.  She is a patient of this clinic.  I serve as her primary care physician.  I recommend she gets a sit and stand desk.    If you have any questions or concerns, please don't hesitate to call.    Sincerely,         Electronically signed by Melina Case MD